# Patient Record
Sex: MALE | Race: WHITE | NOT HISPANIC OR LATINO
[De-identification: names, ages, dates, MRNs, and addresses within clinical notes are randomized per-mention and may not be internally consistent; named-entity substitution may affect disease eponyms.]

---

## 2022-03-26 ENCOUNTER — TRANSCRIPTION ENCOUNTER (OUTPATIENT)
Age: 27
End: 2022-03-26

## 2022-06-16 ENCOUNTER — APPOINTMENT (OUTPATIENT)
Dept: ORTHOPEDIC SURGERY | Facility: CLINIC | Age: 27
End: 2022-06-16
Payer: COMMERCIAL

## 2022-06-16 DIAGNOSIS — Z78.9 OTHER SPECIFIED HEALTH STATUS: ICD-10-CM

## 2022-06-16 DIAGNOSIS — S62.025K NONDISPLACED FRACTURE OF MIDDLE THIRD OF NAVICULAR [SCAPHOID] BONE OF LEFT WRIST, SUBSEQUENT ENCOUNTER FOR FRACTURE WITH NONUNION: ICD-10-CM

## 2022-06-16 PROBLEM — Z00.00 ENCOUNTER FOR PREVENTIVE HEALTH EXAMINATION: Status: ACTIVE | Noted: 2022-06-16

## 2022-06-16 PROCEDURE — L3908: CPT

## 2022-06-16 PROCEDURE — 73110 X-RAY EXAM OF WRIST: CPT | Mod: LT

## 2022-06-16 PROCEDURE — 99213 OFFICE O/P EST LOW 20 MIN: CPT

## 2022-06-16 NOTE — HISTORY OF PRESENT ILLNESS
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

## 2022-06-16 NOTE — IMAGING
[Left] : left wrist [de-identified] : Left wrist:\par TTP over scaphoid\par dorsal flexion to 10\par volar flexion to 40 with pain [FreeTextEntry8] : Scaphoid nonunion with broken screw

## 2022-06-20 ENCOUNTER — FORM ENCOUNTER (OUTPATIENT)
Age: 27
End: 2022-06-20

## 2022-06-21 ENCOUNTER — APPOINTMENT (OUTPATIENT)
Dept: MRI IMAGING | Facility: CLINIC | Age: 27
End: 2022-06-21
Payer: COMMERCIAL

## 2022-06-21 PROCEDURE — 73223 MRI JOINT UPR EXTR W/O&W/DYE: CPT | Mod: LT

## 2022-06-23 ENCOUNTER — APPOINTMENT (OUTPATIENT)
Dept: ORTHOPEDIC SURGERY | Facility: CLINIC | Age: 27
End: 2022-06-23

## 2022-07-06 ENCOUNTER — NON-APPOINTMENT (OUTPATIENT)
Age: 27
End: 2022-07-06

## 2023-08-22 ENCOUNTER — APPOINTMENT (OUTPATIENT)
Dept: SURGERY | Facility: CLINIC | Age: 28
End: 2023-08-22
Payer: COMMERCIAL

## 2023-08-22 VITALS
WEIGHT: 250 LBS | DIASTOLIC BLOOD PRESSURE: 88 MMHG | OXYGEN SATURATION: 97 % | BODY MASS INDEX: 29.52 KG/M2 | HEIGHT: 77 IN | HEART RATE: 101 BPM | TEMPERATURE: 97.3 F | SYSTOLIC BLOOD PRESSURE: 144 MMHG | RESPIRATION RATE: 18 BRPM

## 2023-08-22 DIAGNOSIS — Z84.0 FAMILY HISTORY OF DISEASES OF THE SKIN AND SUBCUTANEOUS TISSUE: ICD-10-CM

## 2023-08-22 DIAGNOSIS — Z22.321 CARRIER OR SUSPECTED CARRIER OF METHICILLIN SUSCEPTIBLE STAPHYLOCOCCUS AUREUS: ICD-10-CM

## 2023-08-22 DIAGNOSIS — Z78.9 OTHER SPECIFIED HEALTH STATUS: ICD-10-CM

## 2023-08-22 DIAGNOSIS — Z22.322 CARRIER OR SUSPECTED CARRIER OF METHICILLIN RESISTANT STAPHYLOCOCCUS AUREUS: ICD-10-CM

## 2023-08-22 DIAGNOSIS — K40.90 UNILATERAL INGUINAL HERNIA, W/OUT OBSTRUCTION OR GANGRENE, NOT SPECIFIED AS RECURRENT: ICD-10-CM

## 2023-08-22 PROCEDURE — 99203 OFFICE O/P NEW LOW 30 MIN: CPT

## 2023-08-24 ENCOUNTER — OUTPATIENT (OUTPATIENT)
Dept: OUTPATIENT SERVICES | Facility: HOSPITAL | Age: 28
LOS: 1 days | End: 2023-08-24
Payer: COMMERCIAL

## 2023-08-24 VITALS
TEMPERATURE: 98 F | DIASTOLIC BLOOD PRESSURE: 84 MMHG | HEIGHT: 77 IN | RESPIRATION RATE: 15 BRPM | HEART RATE: 71 BPM | WEIGHT: 244.05 LBS | OXYGEN SATURATION: 97 % | SYSTOLIC BLOOD PRESSURE: 130 MMHG

## 2023-08-24 DIAGNOSIS — Z98.890 OTHER SPECIFIED POSTPROCEDURAL STATES: Chronic | ICD-10-CM

## 2023-08-24 DIAGNOSIS — K40.30 UNILATERAL INGUINAL HERNIA, WITH OBSTRUCTION, WITHOUT GANGRENE, NOT SPECIFIED AS RECURRENT: ICD-10-CM

## 2023-08-24 DIAGNOSIS — K40.90 UNILATERAL INGUINAL HERNIA, WITHOUT OBSTRUCTION OR GANGRENE, NOT SPECIFIED AS RECURRENT: ICD-10-CM

## 2023-08-24 DIAGNOSIS — Z01.818 ENCOUNTER FOR OTHER PREPROCEDURAL EXAMINATION: ICD-10-CM

## 2023-08-24 LAB
ANION GAP SERPL CALC-SCNC: 14 MMOL/L — SIGNIFICANT CHANGE UP (ref 5–17)
BUN SERPL-MCNC: 16 MG/DL — SIGNIFICANT CHANGE UP (ref 7–23)
CALCIUM SERPL-MCNC: 9.9 MG/DL — SIGNIFICANT CHANGE UP (ref 8.4–10.5)
CHLORIDE SERPL-SCNC: 104 MMOL/L — SIGNIFICANT CHANGE UP (ref 96–108)
CO2 SERPL-SCNC: 24 MMOL/L — SIGNIFICANT CHANGE UP (ref 22–31)
CREAT SERPL-MCNC: 1.12 MG/DL — SIGNIFICANT CHANGE UP (ref 0.5–1.3)
EGFR: 94 ML/MIN/1.73M2 — SIGNIFICANT CHANGE UP
GLUCOSE SERPL-MCNC: 80 MG/DL — SIGNIFICANT CHANGE UP (ref 70–99)
HCT VFR BLD CALC: 48.4 % — SIGNIFICANT CHANGE UP (ref 39–50)
HGB BLD-MCNC: 16.5 G/DL — SIGNIFICANT CHANGE UP (ref 13–17)
MCHC RBC-ENTMCNC: 31.1 PG — SIGNIFICANT CHANGE UP (ref 27–34)
MCHC RBC-ENTMCNC: 34.1 GM/DL — SIGNIFICANT CHANGE UP (ref 32–36)
MCV RBC AUTO: 91.3 FL — SIGNIFICANT CHANGE UP (ref 80–100)
MRSA SPEC QL CULT: NOT DETECTED
NRBC # BLD: 0 /100 WBCS — SIGNIFICANT CHANGE UP (ref 0–0)
PLATELET # BLD AUTO: 277 K/UL — SIGNIFICANT CHANGE UP (ref 150–400)
POTASSIUM SERPL-MCNC: 3.9 MMOL/L — SIGNIFICANT CHANGE UP (ref 3.5–5.3)
POTASSIUM SERPL-SCNC: 3.9 MMOL/L — SIGNIFICANT CHANGE UP (ref 3.5–5.3)
RBC # BLD: 5.3 M/UL — SIGNIFICANT CHANGE UP (ref 4.2–5.8)
RBC # FLD: 11.8 % — SIGNIFICANT CHANGE UP (ref 10.3–14.5)
SODIUM SERPL-SCNC: 142 MMOL/L — SIGNIFICANT CHANGE UP (ref 135–145)
STAPH AUREUS (SA): NOT DETECTED
WBC # BLD: 6.23 K/UL — SIGNIFICANT CHANGE UP (ref 3.8–10.5)
WBC # FLD AUTO: 6.23 K/UL — SIGNIFICANT CHANGE UP (ref 3.8–10.5)

## 2023-08-24 PROCEDURE — 80048 BASIC METABOLIC PNL TOTAL CA: CPT

## 2023-08-24 PROCEDURE — G0463: CPT

## 2023-08-24 PROCEDURE — 85027 COMPLETE CBC AUTOMATED: CPT

## 2023-08-24 RX ORDER — SODIUM CHLORIDE 9 MG/ML
1000 INJECTION, SOLUTION INTRAVENOUS
Refills: 0 | Status: DISCONTINUED | OUTPATIENT
Start: 2023-08-30 | End: 2023-09-13

## 2023-08-24 RX ORDER — SODIUM CHLORIDE 9 MG/ML
3 INJECTION INTRAMUSCULAR; INTRAVENOUS; SUBCUTANEOUS EVERY 8 HOURS
Refills: 0 | Status: DISCONTINUED | OUTPATIENT
Start: 2023-08-30 | End: 2023-09-13

## 2023-08-24 NOTE — H&P PST ADULT - ASSESSMENT
Dental:  no loose teeth, no removable teeth    Dasi: Very active, plays basketball several times a week, goes to gym daily cardio/wts

## 2023-08-24 NOTE — H&P PST ADULT - PROBLEM SELECTOR PLAN 1
Scheduled for open left inguinal hernia repair on 8/30/2023  Labs pending.  MRSA done in surgeon's office, neg or MRSA/MSSA  Chlorhexidine to affected area preop

## 2023-08-24 NOTE — H&P PST ADULT - HISTORY OF PRESENT ILLNESS
Mr. Salinas is a 28 yo man with no significant medical history with a left inguinal hernia scheduled for left inguinal hernia repair on 8/30/2022.

## 2023-08-29 ENCOUNTER — TRANSCRIPTION ENCOUNTER (OUTPATIENT)
Age: 28
End: 2023-08-29

## 2023-08-30 ENCOUNTER — RESULT REVIEW (OUTPATIENT)
Age: 28
End: 2023-08-30

## 2023-08-30 ENCOUNTER — TRANSCRIPTION ENCOUNTER (OUTPATIENT)
Age: 28
End: 2023-08-30

## 2023-08-30 ENCOUNTER — OUTPATIENT (OUTPATIENT)
Dept: OUTPATIENT SERVICES | Facility: HOSPITAL | Age: 28
LOS: 1 days | End: 2023-08-30
Payer: COMMERCIAL

## 2023-08-30 ENCOUNTER — APPOINTMENT (OUTPATIENT)
Dept: SURGERY | Facility: HOSPITAL | Age: 28
End: 2023-08-30
Payer: COMMERCIAL

## 2023-08-30 VITALS
HEART RATE: 62 BPM | HEIGHT: 77 IN | SYSTOLIC BLOOD PRESSURE: 129 MMHG | OXYGEN SATURATION: 99 % | TEMPERATURE: 98 F | DIASTOLIC BLOOD PRESSURE: 83 MMHG | WEIGHT: 244.05 LBS | RESPIRATION RATE: 16 BRPM

## 2023-08-30 VITALS
HEART RATE: 73 BPM | DIASTOLIC BLOOD PRESSURE: 59 MMHG | SYSTOLIC BLOOD PRESSURE: 123 MMHG | TEMPERATURE: 97 F | OXYGEN SATURATION: 97 % | RESPIRATION RATE: 14 BRPM

## 2023-08-30 DIAGNOSIS — Z98.890 OTHER SPECIFIED POSTPROCEDURAL STATES: Chronic | ICD-10-CM

## 2023-08-30 DIAGNOSIS — K40.90 UNILATERAL INGUINAL HERNIA, WITHOUT OBSTRUCTION OR GANGRENE, NOT SPECIFIED AS RECURRENT: ICD-10-CM

## 2023-08-30 PROCEDURE — 49505 PRP I/HERN INIT REDUC >5 YR: CPT | Mod: RT

## 2023-08-30 PROCEDURE — 49505 PRP I/HERN INIT REDUC >5 YR: CPT | Mod: LT

## 2023-08-30 PROCEDURE — 88304 TISSUE EXAM BY PATHOLOGIST: CPT

## 2023-08-30 PROCEDURE — 88304 TISSUE EXAM BY PATHOLOGIST: CPT | Mod: 26

## 2023-08-30 PROCEDURE — C9399: CPT

## 2023-08-30 PROCEDURE — C1781: CPT

## 2023-08-30 PROCEDURE — C1889: CPT

## 2023-08-30 DEVICE — CLIP APPLIER COVIDIEN SURGICLIP III 9" SM: Type: IMPLANTABLE DEVICE | Site: LEFT | Status: FUNCTIONAL

## 2023-08-30 DEVICE — MESH HERNIA PARIETEX PROGRIP 14 X 9CM LEFT: Type: IMPLANTABLE DEVICE | Site: LEFT | Status: FUNCTIONAL

## 2023-08-30 RX ORDER — OXYCODONE HYDROCHLORIDE 5 MG/1
1 TABLET ORAL
Qty: 5 | Refills: 0
Start: 2023-08-30

## 2023-08-30 RX ORDER — FENTANYL CITRATE 50 UG/ML
25 INJECTION INTRAVENOUS
Refills: 0 | Status: DISCONTINUED | OUTPATIENT
Start: 2023-08-30 | End: 2023-08-30

## 2023-08-30 RX ORDER — ONDANSETRON 8 MG/1
4 TABLET, FILM COATED ORAL ONCE
Refills: 0 | Status: COMPLETED | OUTPATIENT
Start: 2023-08-30 | End: 2023-08-30

## 2023-08-30 RX ORDER — LIDOCAINE HCL 20 MG/ML
0.2 VIAL (ML) INJECTION ONCE
Refills: 0 | Status: COMPLETED | OUTPATIENT
Start: 2023-08-30 | End: 2023-08-30

## 2023-08-30 RX ORDER — CHLORHEXIDINE GLUCONATE 213 G/1000ML
1 SOLUTION TOPICAL ONCE
Refills: 0 | Status: COMPLETED | OUTPATIENT
Start: 2023-08-30 | End: 2023-08-30

## 2023-08-30 RX ORDER — CEFAZOLIN SODIUM 1 G
2000 VIAL (EA) INJECTION ONCE
Refills: 0 | Status: COMPLETED | OUTPATIENT
Start: 2023-08-30 | End: 2023-08-30

## 2023-08-30 RX ORDER — IBUPROFEN 200 MG
1 TABLET ORAL
Qty: 0 | Refills: 0 | DISCHARGE

## 2023-08-30 RX ORDER — ACETAMINOPHEN 500 MG
1 TABLET ORAL
Qty: 0 | Refills: 0 | DISCHARGE

## 2023-08-30 RX ADMIN — FENTANYL CITRATE 25 MICROGRAM(S): 50 INJECTION INTRAVENOUS at 09:05

## 2023-08-30 RX ADMIN — ONDANSETRON 4 MILLIGRAM(S): 8 TABLET, FILM COATED ORAL at 08:48

## 2023-08-30 RX ADMIN — SODIUM CHLORIDE 100 MILLILITER(S): 9 INJECTION, SOLUTION INTRAVENOUS at 06:13

## 2023-08-30 RX ADMIN — CHLORHEXIDINE GLUCONATE 1 APPLICATION(S): 213 SOLUTION TOPICAL at 06:13

## 2023-08-30 RX ADMIN — FENTANYL CITRATE 25 MICROGRAM(S): 50 INJECTION INTRAVENOUS at 09:20

## 2023-08-30 NOTE — ASU DISCHARGE PLAN (ADULT/PEDIATRIC) - DISCHARGE PLAN IS COMPLETE AND GIVEN TO PATIENT
Incoming call from patient who encountered injection failure with 2 Humira pens. See gonzalo.   : Yes

## 2023-08-30 NOTE — ASU DISCHARGE PLAN (ADULT/PEDIATRIC) - NS MD DC FALL RISK RISK
For information on Fall & Injury Prevention, visit: [email or web address] OR  [email or web address] OR  https://www.cdc.gov/steadi/patient.html

## 2023-08-30 NOTE — ASU DISCHARGE PLAN (ADULT/PEDIATRIC) - ASU DC SPECIAL INSTRUCTIONSFT
You may shower over the steri-strips starting in the AM.  You may eat a regular diet.  You may not drive a car until you no longer need the prescribed pain medications.  You should not lift more the 30 pounds for at least 30 days.  You may return to work when you are comfortable.  If you do not have a bowel movement in the next day or so, take prunes, milk of magnesia or a fleets enema.  The operative area will feel hard and this is normal.  There may be some black and blue in the operative area and at the top of the scrotum this also is normal.  Please call the office ([phone]) and make an appointment in 7-10 days for post-op exam.

## 2023-08-30 NOTE — PHYSICAL EXAM
[Normal Breath Sounds] : Normal breath sounds [Normal Heart Sounds] : normal heart sounds [No Rash or Lesion] : No rash or lesion [Alert] : alert [Oriented to Person] : oriented to person [Oriented to Place] : oriented to place [Calm] : calm [de-identified] : WNL [de-identified] : WNL -cranial nerves II through XII intact [de-identified] : JAYNEL [de-identified] : Examination of the left groin reveals a small hernia at the internal ring.  The left cord and testicle are normal. [de-identified] : WNL ROM [de-identified] : WNL

## 2023-08-30 NOTE — ASU DISCHARGE PLAN (ADULT/PEDIATRIC) - CARE PROVIDER_API CALL
Avery Driscoll  Surgery  [address] [place] [address], [facility] [address]  [place] [address]  Phone: [phone]  Fax: [phone]  Follow Up Time: 1 week

## 2023-08-30 NOTE — ASU DISCHARGE PLAN (ADULT/PEDIATRIC) - BATHING
No shower until 08/31 AM. Ok to shower, do not scrub on steri-strips over incision, just let water wash over it. Do not remove steristrips, they will fall off on their own or we will remove in office.

## 2023-08-30 NOTE — BRIEF OPERATIVE NOTE - NSICDXBRIEFPROCEDURE_GEN_ALL_CORE_FT
PROCEDURES:  Repair, hernia, inguinal, open, using mesh, adult 30-Aug-2023 08:46:02  Dewey Willett

## 2023-08-30 NOTE — HISTORY OF PRESENT ILLNESS
[de-identified] : Donny is a 28 y/o male here for consultation visit for possible LIH.  The patient had a ultrasound of the groin with Valsalva which highly suggestive of a fat-containing inguinal   hernia.  Patient was then sent for a CT of the abdomen and pelvis which was done without Valsalva which showed no hernia.  The patient complains of left groin pain radiating into the testicle.

## 2023-08-30 NOTE — ASU DISCHARGE PLAN (ADULT/PEDIATRIC) - NURSING INSTRUCTIONS
You received a dose of Tylenol today at 7:15 AM this morning. If needed, next dose time is 1:15 PM this afternoon. Do not exceed 4,000 mg of Tylenol in a 24 hour period.

## 2023-08-30 NOTE — CONSULT LETTER
[Dear  ___] : Dear  [unfilled], [Consult Letter:] : I had the pleasure of evaluating your patient, [unfilled]. [Please see my note below.] : Please see my note below. [Consult Closing:] : Thank you very much for allowing me to participate in the care of this patient.  If you have any questions, please do not hesitate to contact me. [Sincerely,] : Sincerely, [FreeTextEntry3] : I have reviewed all the documentation for this encounter with the patient and have edited where appropriate  Dr. Avery Driscoll

## 2023-08-31 PROBLEM — Z78.9 OTHER SPECIFIED HEALTH STATUS: Chronic | Status: ACTIVE | Noted: 2023-08-24

## 2023-09-02 ENCOUNTER — EMERGENCY (EMERGENCY)
Facility: HOSPITAL | Age: 28
LOS: 1 days | Discharge: HOME | End: 2023-09-02
Attending: EMERGENCY MEDICINE
Payer: COMMERCIAL

## 2023-09-02 ENCOUNTER — TRANSCRIPTION ENCOUNTER (OUTPATIENT)
Age: 28
End: 2023-09-02

## 2023-09-02 VITALS
HEIGHT: 77 IN | OXYGEN SATURATION: 99 % | SYSTOLIC BLOOD PRESSURE: 151 MMHG | TEMPERATURE: 99 F | DIASTOLIC BLOOD PRESSURE: 78 MMHG | HEART RATE: 86 BPM | WEIGHT: 250 LBS | RESPIRATION RATE: 18 BRPM

## 2023-09-02 VITALS
OXYGEN SATURATION: 100 % | SYSTOLIC BLOOD PRESSURE: 140 MMHG | TEMPERATURE: 98 F | RESPIRATION RATE: 18 BRPM | DIASTOLIC BLOOD PRESSURE: 71 MMHG | HEART RATE: 65 BPM

## 2023-09-02 DIAGNOSIS — Z98.890 OTHER SPECIFIED POSTPROCEDURAL STATES: Chronic | ICD-10-CM

## 2023-09-02 LAB
ALBUMIN SERPL ELPH-MCNC: 4.2 G/DL — SIGNIFICANT CHANGE UP (ref 3.3–5)
ALP SERPL-CCNC: 53 U/L — SIGNIFICANT CHANGE UP (ref 40–120)
ALT FLD-CCNC: 17 U/L — SIGNIFICANT CHANGE UP (ref 10–45)
ANION GAP SERPL CALC-SCNC: 14 MMOL/L — SIGNIFICANT CHANGE UP (ref 5–17)
AST SERPL-CCNC: 14 U/L — SIGNIFICANT CHANGE UP (ref 10–40)
BASOPHILS # BLD AUTO: 0.03 K/UL — SIGNIFICANT CHANGE UP (ref 0–0.2)
BASOPHILS NFR BLD AUTO: 0.5 % — SIGNIFICANT CHANGE UP (ref 0–2)
BILIRUB SERPL-MCNC: 0.4 MG/DL — SIGNIFICANT CHANGE UP (ref 0.2–1.2)
BUN SERPL-MCNC: 21 MG/DL — SIGNIFICANT CHANGE UP (ref 7–23)
CALCIUM SERPL-MCNC: 9.9 MG/DL — SIGNIFICANT CHANGE UP (ref 8.4–10.5)
CHLORIDE SERPL-SCNC: 107 MMOL/L — SIGNIFICANT CHANGE UP (ref 96–108)
CO2 SERPL-SCNC: 21 MMOL/L — LOW (ref 22–31)
CREAT SERPL-MCNC: 1.06 MG/DL — SIGNIFICANT CHANGE UP (ref 0.5–1.3)
EGFR: 100 ML/MIN/1.73M2 — SIGNIFICANT CHANGE UP
EOSINOPHIL # BLD AUTO: 0.12 K/UL — SIGNIFICANT CHANGE UP (ref 0–0.5)
EOSINOPHIL NFR BLD AUTO: 2.1 % — SIGNIFICANT CHANGE UP (ref 0–6)
GLUCOSE SERPL-MCNC: 92 MG/DL — SIGNIFICANT CHANGE UP (ref 70–99)
HCT VFR BLD CALC: 44.6 % — SIGNIFICANT CHANGE UP (ref 39–50)
HGB BLD-MCNC: 15 G/DL — SIGNIFICANT CHANGE UP (ref 13–17)
IMM GRANULOCYTES NFR BLD AUTO: 0.3 % — SIGNIFICANT CHANGE UP (ref 0–0.9)
LYMPHOCYTES # BLD AUTO: 1.43 K/UL — SIGNIFICANT CHANGE UP (ref 1–3.3)
LYMPHOCYTES # BLD AUTO: 25 % — SIGNIFICANT CHANGE UP (ref 13–44)
MAGNESIUM SERPL-MCNC: 2 MG/DL — SIGNIFICANT CHANGE UP (ref 1.6–2.6)
MCHC RBC-ENTMCNC: 30.7 PG — SIGNIFICANT CHANGE UP (ref 27–34)
MCHC RBC-ENTMCNC: 33.6 GM/DL — SIGNIFICANT CHANGE UP (ref 32–36)
MCV RBC AUTO: 91.2 FL — SIGNIFICANT CHANGE UP (ref 80–100)
MONOCYTES # BLD AUTO: 0.64 K/UL — SIGNIFICANT CHANGE UP (ref 0–0.9)
MONOCYTES NFR BLD AUTO: 11.2 % — SIGNIFICANT CHANGE UP (ref 2–14)
NEUTROPHILS # BLD AUTO: 3.49 K/UL — SIGNIFICANT CHANGE UP (ref 1.8–7.4)
NEUTROPHILS NFR BLD AUTO: 60.9 % — SIGNIFICANT CHANGE UP (ref 43–77)
NRBC # BLD: 0 /100 WBCS — SIGNIFICANT CHANGE UP (ref 0–0)
PLATELET # BLD AUTO: 232 K/UL — SIGNIFICANT CHANGE UP (ref 150–400)
POTASSIUM SERPL-MCNC: 3.7 MMOL/L — SIGNIFICANT CHANGE UP (ref 3.5–5.3)
POTASSIUM SERPL-SCNC: 3.7 MMOL/L — SIGNIFICANT CHANGE UP (ref 3.5–5.3)
PROCALCITONIN SERPL-MCNC: 0.03 NG/ML — SIGNIFICANT CHANGE UP (ref 0.02–0.1)
PROT SERPL-MCNC: 6.8 G/DL — SIGNIFICANT CHANGE UP (ref 6–8.3)
RBC # BLD: 4.89 M/UL — SIGNIFICANT CHANGE UP (ref 4.2–5.8)
RBC # FLD: 11.9 % — SIGNIFICANT CHANGE UP (ref 10.3–14.5)
SODIUM SERPL-SCNC: 142 MMOL/L — SIGNIFICANT CHANGE UP (ref 135–145)
WBC # BLD: 5.73 K/UL — SIGNIFICANT CHANGE UP (ref 3.8–10.5)
WBC # FLD AUTO: 5.73 K/UL — SIGNIFICANT CHANGE UP (ref 3.8–10.5)

## 2023-09-02 PROCEDURE — 80053 COMPREHEN METABOLIC PANEL: CPT

## 2023-09-02 PROCEDURE — 96374 THER/PROPH/DIAG INJ IV PUSH: CPT | Mod: XU

## 2023-09-02 PROCEDURE — 83735 ASSAY OF MAGNESIUM: CPT

## 2023-09-02 PROCEDURE — 99285 EMERGENCY DEPT VISIT HI MDM: CPT

## 2023-09-02 PROCEDURE — 84145 PROCALCITONIN (PCT): CPT

## 2023-09-02 PROCEDURE — 85025 COMPLETE CBC W/AUTO DIFF WBC: CPT

## 2023-09-02 PROCEDURE — 99284 EMERGENCY DEPT VISIT MOD MDM: CPT | Mod: 25

## 2023-09-02 PROCEDURE — 71045 X-RAY EXAM CHEST 1 VIEW: CPT

## 2023-09-02 PROCEDURE — 71045 X-RAY EXAM CHEST 1 VIEW: CPT | Mod: 26

## 2023-09-02 PROCEDURE — 36415 COLL VENOUS BLD VENIPUNCTURE: CPT

## 2023-09-02 RX ORDER — SODIUM CHLORIDE 9 MG/ML
1000 INJECTION, SOLUTION INTRAVENOUS ONCE
Refills: 0 | Status: COMPLETED | OUTPATIENT
Start: 2023-09-02 | End: 2023-09-02

## 2023-09-02 RX ORDER — ACETAMINOPHEN 500 MG
1000 TABLET ORAL ONCE
Refills: 0 | Status: COMPLETED | OUTPATIENT
Start: 2023-09-02 | End: 2023-09-02

## 2023-09-02 RX ADMIN — SODIUM CHLORIDE 2000 MILLILITER(S): 9 INJECTION, SOLUTION INTRAVENOUS at 14:23

## 2023-09-02 RX ADMIN — Medication 1 ENEMA: at 14:51

## 2023-09-02 RX ADMIN — Medication 400 MILLIGRAM(S): at 13:56

## 2023-09-02 NOTE — ED PROVIDER NOTE - CLINICAL SUMMARY MEDICAL DECISION MAKING FREE TEXT BOX
Henry Flanagan, DO PGY-3: 27-year-old male no past medical history presents ED complaining of left groin pain status post left inguinal hernia repair on 8/30 with Dr. Driscoll.  Patient states since surgery the pain has been gradually increasing with worsening constipation.  Patient is passing flatus.  Patient states his dog jumped on him which made the pain even worse prompting the ED visit today.  Denies fever, cough, shortness of breath, chest pain, abdominal pain, dysuria, diarrhea. Surgical site well-appearing with tenderness over the area, hemodynamically stable and afebrile.  Differential includes not limited to postop pain, low suspicion for abscess, SBO.  Plan for labs, CT.  Reassess

## 2023-09-02 NOTE — ED PROVIDER NOTE - NSFOLLOWUPINSTRUCTIONS_ED_ALL_ED_FT
See your Primary Doctor / Surgeons next week for follow up -- call to discuss.    Stay well hydrated, eat regular healthy meals, increase activity as tolerated.    Use Miralax daily as directed for constipation -- see medication warnings.    See CONSTIPATION information and return instructions given to you.    Seek immediate medical care for new/worsening symptoms/concerns.

## 2023-09-02 NOTE — ED ADULT NURSE NOTE - OBJECTIVE STATEMENT
Pt is a 27y M, AxO3, no signif PMH, presents to ED for post surgical pain. Pt had a L inguinal hernia Sx 4 days ago on Wednesday. Pt reporting increased pain since the procedure. On Wednesday evening pt dog jumped onto him, furthering the pain. On assessment, incision site is clean and dry, abd is soft, tender to LLQ, mild numbness noted below incision site. Endorsing constipation- last BM Tuesday before Sx, inability to ambulate or sit up due to pain. Has been passing flatus.  Denies fevers, chills, N/V, dizziness, loss of appetite. Pt is resting in bed, speaking full sentences, VSS, no acute distress at this time. Pt is a 27y M, AxO3, no signif PMH, presents to ED for post surgical pain. Pt had a L inguinal hernia Sx 4 days ago on Wednesday. Pt reporting increased pain localized to groin since the procedure. On Wednesday evening pt dog jumped onto him, furthering the pain. On assessment, incision site is clean and dry, abd is soft, tender to LLQ, mild numbness noted below incision site. Endorsing constipation- last BM Tuesday before Sx, inability to ambulate or sit up due to pain. Has been passing flatus.  Denies fevers, chills, N/V, dizziness, loss of appetite. Pt is resting in bed, speaking full sentences, VSS, no acute distress at this time.

## 2023-09-02 NOTE — ED PROVIDER NOTE - PHYSICAL EXAMINATION
GEN: Patient awake alert NAD.   HEENT: normocephalic, atraumatic, moist MM  CARDIAC: RRR, S1, S2, no murmur.   PULM: CTA B/L no wheeze, rhonchi, rales.   ABD: soft NT, ND, no rebound no guarding.  Left inguinal surgical scar well-appearing with tenderness and Steri-Strips in place, without purulent drainage or surrounding erythema  MSK: Moving all extremities, no edema.  NEURO: A&Ox3, no focal neurological deficits  SKIN: warm, dry, no rash.

## 2023-09-02 NOTE — ED ADULT NURSE NOTE - NSFALLRISKINTERV_ED_ALL_ED

## 2023-09-02 NOTE — CONSULT NOTE ADULT - SUBJECTIVE AND OBJECTIVE BOX
SURGERY CONSULT NOTE    Consulting Team:     Patient: LIGIA ORANTES , 27y ([birth year])Male   MRN: [id number]  Location: [facility] ED  Visit: 09-02-23 Emergency  Date: 09-02-23 @ 14:03      HPI:  Patient is a 26 yo M w/ no significant past medical history presents to the ED with L inguinal pain after elective L inguinal hernia repair w/ mesh on 8/30 with Dr. Driscoll. Patient states after returning home from surgery, dog stepped on surgical site with small site oozing and increased pain. Since then, patient states minimal pain relief at surgical site on oxy for 1.5 days and Tylenol/Motrin around the clock. Patient states unable to ambulate 2/2 pain.     Patient otherwise denies fever, chills. Tolerate diet without nausea/vomiting. Denies dysuria. Last BM 4 days ago, however passing gas daily.     In the ED, patient is hemodynamically stable.       PAST MEDICAL HISTORY:  No pertinent past medical history        PAST SURGICAL HISTORY:  S/P wrist surgery    H/O elbow surgery        MEDICATIONS:  lactated ringers Bolus 1000 milliLiter(s) IV Bolus once      ALLERGIES:  No Known Allergies      VITALS & I/Os:  Vital Signs Last 24 Hrs  T(C): 37.2 (02 Sep 2023 12:41), Max: 37.2 (02 Sep 2023 12:41)  T(F): 99 (02 Sep 2023 12:41), Max: 99 (02 Sep 2023 12:41)  HR: 86 (02 Sep 2023 12:41) (86 - 86)  BP: 151/78 (02 Sep 2023 12:41) (151/78 - 151/78)  RR: 18 (02 Sep 2023 12:41) (18 - 18)  SpO2: 99% (02 Sep 2023 12:41) (99% - 99%)    Parameters below as of 02 Sep 2023 12:41  Patient On (Oxygen Delivery Method): room air        I&O's Summary      PHYSICAL EXAM:  General: No acute distress  Respiratory: Nonlabored  Cardiovascular: RRR  Abdominal: Tender to palpation in L inguinal per-incisional region with khloe-incisional numbness. Incision c/d/i without redness, induration. No evidence of hematoma or fluid collection. No palpable recurrent hernia. Soft, nondistended. No rebound or guarding. No organomegaly, no palpable mass.  Extremities: Warm    LABS:          Lactate:                    IMAGING:       SURGERY CONSULT NOTE    Consulting Team:     Patient: LIGIA ORANTES , 27y ([birth year])Male   MRN: [id number]  Location: [facility] ED  Visit: 09-02-23 Emergency  Date: 09-02-23 @ 14:03      HPI:  Patient is a 26 yo M w/ no significant past medical history presents to the ED with L inguinal pain after elective L inguinal hernia repair w/ mesh on 8/30 with Dr. Driscoll. Patient states after returning home from surgery, dog stepped on surgical site with small site oozing and increased pain. Since then, patient states minimal pain relief at surgical site on oxy for 1.5 days and Tylenol/Motrin around the clock. Patient states unable to ambulate 2/2 pain.     Patient otherwise denies fever, chills. Tolerate diet without nausea/vomiting. Denies dysuria. Last BM 4 days ago, however passing gas daily.     In the ED, patient is hemodynamically stable.       PAST MEDICAL HISTORY:  No pertinent past medical history        PAST SURGICAL HISTORY:  S/P wrist surgery    H/O elbow surgery        MEDICATIONS:  lactated ringers Bolus 1000 milliLiter(s) IV Bolus once      ALLERGIES:  No Known Allergies      VITALS & I/Os:  Vital Signs Last 24 Hrs  T(C): 37.2 (02 Sep 2023 12:41), Max: 37.2 (02 Sep 2023 12:41)  T(F): 99 (02 Sep 2023 12:41), Max: 99 (02 Sep 2023 12:41)  HR: 86 (02 Sep 2023 12:41) (86 - 86)  BP: 151/78 (02 Sep 2023 12:41) (151/78 - 151/78)  RR: 18 (02 Sep 2023 12:41) (18 - 18)  SpO2: 99% (02 Sep 2023 12:41) (99% - 99%)    Parameters below as of 02 Sep 2023 12:41  Patient On (Oxygen Delivery Method): room air        I&O's Summary      PHYSICAL EXAM:  General: No acute distress  Respiratory: Nonlabored  Cardiovascular: RRR  Abdominal: Tender to palpation in L inguinal per-incisional region with khloe-incisional numbness. Incision c/d/i without redness, induration. No evidence of hematoma or fluid collection. No palpable recurrent hernia. Soft, nondistended. No rebound or guarding. No organomegaly, no palpable mass.  Extremities: Warm   SURGERY CONSULT NOTE    Consulting Team:     Patient: LIGIA ORANTES , 27y ([birth year])Male   MRN: [id number]  Location: [facility] ED  Visit: 09-02-23 Emergency  Date: 09-02-23 @ 14:03      HPI:  Patient is a 26 yo M w/ no significant past medical history presents to the ED with L inguinal pain after elective L inguinal hernia repair w/ mesh on 8/30 with Dr. Driscoll. Patient states after returning home from surgery, dog stepped on surgical site with small site oozing and increased pain. Since then, patient states minimal pain relief at surgical site on oxy for 1.5 days and Tylenol/Motrin around the clock. Patient states unable to ambulate 2/2 pain.     Patient otherwise denies fever, chills. Tolerate diet without nausea/vomiting. Denies dysuria. Last BM 4 days ago, however passing gas daily.     In the ED, patient is hemodynamically stable.       PAST MEDICAL HISTORY:  No pertinent past medical history        PAST SURGICAL HISTORY:  S/P wrist surgery    H/O elbow surgery        MEDICATIONS:  lactated ringers Bolus 1000 milliLiter(s) IV Bolus once      ALLERGIES:  No Known Allergies      VITALS & I/Os:  Vital Signs Last 24 Hrs  T(C): 37.2 (02 Sep 2023 12:41), Max: 37.2 (02 Sep 2023 12:41)  T(F): 99 (02 Sep 2023 12:41), Max: 99 (02 Sep 2023 12:41)  HR: 86 (02 Sep 2023 12:41) (86 - 86)  BP: 151/78 (02 Sep 2023 12:41) (151/78 - 151/78)  RR: 18 (02 Sep 2023 12:41) (18 - 18)  SpO2: 99% (02 Sep 2023 12:41) (99% - 99%)    Parameters below as of 02 Sep 2023 12:41  Patient On (Oxygen Delivery Method): room air        I&O's Summary      PHYSICAL EXAM:  General: No acute distress  Respiratory: Nonlabored  Cardiovascular: RRR  Abdominal: Tender to palpation in L inguinal per-incisional region with khloe-incisional numbness. Incision c/d/i without redness, induration. No evidence of hematoma or fluid collection. No palpable recurrent hernia. Soft, nondistended. No rebound or guarding. No organomegaly, no palpable mass.  : No scrotal swelling or discoloration. Testes descended bilaterally, no palpable masses, nontender. No inguinal hernia   Extremities: Warm

## 2023-09-02 NOTE — CONSULT NOTE ADULT - ASSESSMENT
- INCOMPLETE -     [facility]  [id number] Patient is a 26 yo M w/ no significant past medical history presents to the ED with L inguinal pain after elective L inguinal hernia repair w/ mesh on 8/30 with Dr. Driscoll. Patient states after returning home from surgery, dog stepped on surgical site with small site oozing and increased pain. Since then, patient states minimal pain relief at surgical site on oxy for 1.5 days and Tylenol/Motrin around the clock. Patient states unable to ambulate 2/2 pain. Patient otherwise denies fever, chills. Tolerate diet without nausea/vomiting. Denies dysuria. Last BM 4 days ago, however passing gas daily. In the ED, patient is hemodynamically stable.     Recommendations:   - No acute surgical intervention   - Patient presenting with likely uncontrolled post-op pain      - No clinical signs of surgical site infection. Continues to pass flatus daily, patient is not clinically obstructed. No palpable bulge on exam to suggest recurrence   - Recommend multimodal pain control: Continue Tylenol/Motrin around the clock. Oxy 5mg [id number] x2 days   - Recommend bowel regimen for post-op constipation and inguinal pain w/ straining: miralax daily   - Please have patient call the office to schedule outpatient f/u visit with Dr. Driscoll next week   - Dispo per ED     Plan discussed with Dr. Singh        [facility]  [id number]

## 2023-09-02 NOTE — ED ADULT TRIAGE NOTE - CHIEF COMPLAINT QUOTE
L. inguinal hernia Sx on Wednesday, dog stepped on surgical site on Wednesday, slight bleeding since Sx on Thursday, acute pain to surgical site, no relief from pain meds at home, surgical site absent of s/s of infection at this time, denies fevers/chills. Constipated, last BM Wednesday prior to Sx, passing gas.

## 2023-09-02 NOTE — ED PROVIDER NOTE - ATTENDING CONTRIBUTION TO CARE
------------ATTENDING NOTE------------    pt w/ partner c/o constipation as straining w/ BMs and hard nb stool since hernia repair 4 days ago, surgical site healing well, overall soft benign abd, evaluated by Surgery who agreed, no fevers/infectious symptoms, nop nausea/vomiting, tolerating PO, improved w/ BM after enema, nml VS at PR, in depth dw all about ddx, tx, trejo, continued close outpt fu.  - Armond Farmer MD   ---------------------------------------------

## 2023-09-02 NOTE — ED PROVIDER NOTE - PATIENT PORTAL LINK FT
You can access the FollowMyHealth Patient Portal offered by [facility] by registering at the following website: [email or web address]. By joining [employer]’s FollowMyHealth portal, you will also be able to view your health information using other applications (apps) compatible with our system.

## 2023-09-02 NOTE — ED PROVIDER NOTE - OBJECTIVE STATEMENT
27-year-old male no past medical history presents ED complaining of left groin pain status post left inguinal hernia repair on 8/30 with Dr. Driscoll.  Patient states since surgery the pain has been gradually increasing with worsening constipation.  Patient is passing flatus.  Patient states his dog jumped on him which made the pain even worse prompting the ED visit today.  Denies fever, cough, shortness of breath, chest pain, abdominal pain, dysuria, diarrhea.

## 2023-09-02 NOTE — ED PROVIDER NOTE - PROGRESS NOTE DETAILS
Henry Flanagan, DO PGY-3: Spoke with surgery who is low suspicion for abscess or internal bleeding and thinks patient's symptoms are likely secondary to postop pain.  They would like to wait on CT imaging pending lab results.  We had shared decision-making with the patient who agrees to wait on CT. Henry Flanagan, DO PGY-3: Patient's labs nonactionable, patient able to have a small bowel movement.  Patient well-appearing but having persistent pain likely secondary to postoperative period.  Will discharge patient to follow-up with Dr. Driscoll his surgeon.

## 2023-09-02 NOTE — ED ADULT NURSE REASSESSMENT NOTE - NS ED NURSE REASSESS COMMENT FT1
Pt reporting normal BM, endorsing sharp shooting rectal pains while defecating, denies any relief post BM. ED MD team aware.

## 2023-09-12 ENCOUNTER — NON-APPOINTMENT (OUTPATIENT)
Age: 28
End: 2023-09-12

## 2023-09-12 LAB — SURGICAL PATHOLOGY STUDY: SIGNIFICANT CHANGE UP

## 2023-09-13 ENCOUNTER — EMERGENCY (EMERGENCY)
Facility: HOSPITAL | Age: 28
LOS: 1 days | Discharge: HOME | End: 2023-09-13
Payer: COMMERCIAL

## 2023-09-13 VITALS
OXYGEN SATURATION: 98 % | HEART RATE: 104 BPM | SYSTOLIC BLOOD PRESSURE: 141 MMHG | WEIGHT: 250 LBS | TEMPERATURE: 98 F | RESPIRATION RATE: 20 BRPM | DIASTOLIC BLOOD PRESSURE: 90 MMHG | HEIGHT: 77 IN

## 2023-09-13 VITALS
OXYGEN SATURATION: 98 % | RESPIRATION RATE: 17 BRPM | SYSTOLIC BLOOD PRESSURE: 124 MMHG | TEMPERATURE: 98 F | HEART RATE: 89 BPM | DIASTOLIC BLOOD PRESSURE: 86 MMHG

## 2023-09-13 DIAGNOSIS — Z98.890 OTHER SPECIFIED POSTPROCEDURAL STATES: Chronic | ICD-10-CM

## 2023-09-13 LAB
ALBUMIN SERPL ELPH-MCNC: 4.7 G/DL — SIGNIFICANT CHANGE UP (ref 3.3–5)
ALP SERPL-CCNC: 70 U/L — SIGNIFICANT CHANGE UP (ref 40–120)
ALT FLD-CCNC: 23 U/L — SIGNIFICANT CHANGE UP (ref 10–45)
ANION GAP SERPL CALC-SCNC: 13 MMOL/L — SIGNIFICANT CHANGE UP (ref 5–17)
APPEARANCE UR: CLEAR — SIGNIFICANT CHANGE UP
AST SERPL-CCNC: 14 U/L — SIGNIFICANT CHANGE UP (ref 10–40)
BACTERIA # UR AUTO: NEGATIVE — SIGNIFICANT CHANGE UP
BASE EXCESS BLDV CALC-SCNC: 3.2 MMOL/L — HIGH (ref -2–3)
BASOPHILS # BLD AUTO: 0.05 K/UL — SIGNIFICANT CHANGE UP (ref 0–0.2)
BASOPHILS NFR BLD AUTO: 0.4 % — SIGNIFICANT CHANGE UP (ref 0–2)
BILIRUB SERPL-MCNC: 0.6 MG/DL — SIGNIFICANT CHANGE UP (ref 0.2–1.2)
BILIRUB UR-MCNC: NEGATIVE — SIGNIFICANT CHANGE UP
BUN SERPL-MCNC: 13 MG/DL — SIGNIFICANT CHANGE UP (ref 7–23)
CA-I SERPL-SCNC: 1.24 MMOL/L — SIGNIFICANT CHANGE UP (ref 1.15–1.33)
CALCIUM SERPL-MCNC: 10 MG/DL — SIGNIFICANT CHANGE UP (ref 8.4–10.5)
CHLORIDE BLDV-SCNC: 101 MMOL/L — SIGNIFICANT CHANGE UP (ref 96–108)
CHLORIDE SERPL-SCNC: 103 MMOL/L — SIGNIFICANT CHANGE UP (ref 96–108)
CO2 BLDV-SCNC: 33 MMOL/L — HIGH (ref 22–26)
CO2 SERPL-SCNC: 24 MMOL/L — SIGNIFICANT CHANGE UP (ref 22–31)
COLOR SPEC: YELLOW — SIGNIFICANT CHANGE UP
CREAT SERPL-MCNC: 1.2 MG/DL — SIGNIFICANT CHANGE UP (ref 0.5–1.3)
DIFF PNL FLD: NEGATIVE — SIGNIFICANT CHANGE UP
EGFR: 86 ML/MIN/1.73M2 — SIGNIFICANT CHANGE UP
EOSINOPHIL # BLD AUTO: 0.11 K/UL — SIGNIFICANT CHANGE UP (ref 0–0.5)
EOSINOPHIL NFR BLD AUTO: 0.9 % — SIGNIFICANT CHANGE UP (ref 0–6)
EPI CELLS # UR: 1 /HPF — SIGNIFICANT CHANGE UP
FLUAV AG NPH QL: SIGNIFICANT CHANGE UP
FLUBV AG NPH QL: SIGNIFICANT CHANGE UP
GAS PNL BLDV: 136 MMOL/L — SIGNIFICANT CHANGE UP (ref 136–145)
GAS PNL BLDV: SIGNIFICANT CHANGE UP
GAS PNL BLDV: SIGNIFICANT CHANGE UP
GLUCOSE BLDV-MCNC: 92 MG/DL — SIGNIFICANT CHANGE UP (ref 70–99)
GLUCOSE SERPL-MCNC: 93 MG/DL — SIGNIFICANT CHANGE UP (ref 70–99)
GLUCOSE UR QL: NEGATIVE — SIGNIFICANT CHANGE UP
HCO3 BLDV-SCNC: 31 MMOL/L — HIGH (ref 22–29)
HCT VFR BLD CALC: 47 % — SIGNIFICANT CHANGE UP (ref 39–50)
HCT VFR BLDA CALC: 49 % — SIGNIFICANT CHANGE UP (ref 39–51)
HGB BLD CALC-MCNC: 16.4 G/DL — SIGNIFICANT CHANGE UP (ref 12.6–17.4)
HGB BLD-MCNC: 16.2 G/DL — SIGNIFICANT CHANGE UP (ref 13–17)
HYALINE CASTS # UR AUTO: 11 /LPF — HIGH (ref 0–2)
IMM GRANULOCYTES NFR BLD AUTO: 0.2 % — SIGNIFICANT CHANGE UP (ref 0–0.9)
KETONES UR-MCNC: NEGATIVE — SIGNIFICANT CHANGE UP
LACTATE BLDV-MCNC: 1.8 MMOL/L — SIGNIFICANT CHANGE UP (ref 0.5–2)
LEUKOCYTE ESTERASE UR-ACNC: NEGATIVE — SIGNIFICANT CHANGE UP
LYMPHOCYTES # BLD AUTO: 1.86 K/UL — SIGNIFICANT CHANGE UP (ref 1–3.3)
LYMPHOCYTES # BLD AUTO: 15 % — SIGNIFICANT CHANGE UP (ref 13–44)
MCHC RBC-ENTMCNC: 30.7 PG — SIGNIFICANT CHANGE UP (ref 27–34)
MCHC RBC-ENTMCNC: 34.5 GM/DL — SIGNIFICANT CHANGE UP (ref 32–36)
MCV RBC AUTO: 89.2 FL — SIGNIFICANT CHANGE UP (ref 80–100)
MONOCYTES # BLD AUTO: 1.37 K/UL — HIGH (ref 0–0.9)
MONOCYTES NFR BLD AUTO: 11.1 % — SIGNIFICANT CHANGE UP (ref 2–14)
NEUTROPHILS # BLD AUTO: 8.97 K/UL — HIGH (ref 1.8–7.4)
NEUTROPHILS NFR BLD AUTO: 72.4 % — SIGNIFICANT CHANGE UP (ref 43–77)
NITRITE UR-MCNC: NEGATIVE — SIGNIFICANT CHANGE UP
NRBC # BLD: 0 /100 WBCS — SIGNIFICANT CHANGE UP (ref 0–0)
PCO2 BLDV: 59 MMHG — HIGH (ref 42–55)
PH BLDV: 7.33 — SIGNIFICANT CHANGE UP (ref 7.32–7.43)
PH UR: 6.5 — SIGNIFICANT CHANGE UP (ref 5–8)
PLATELET # BLD AUTO: 262 K/UL — SIGNIFICANT CHANGE UP (ref 150–400)
PO2 BLDV: 19 MMHG — LOW (ref 25–45)
POTASSIUM BLDV-SCNC: 4.1 MMOL/L — SIGNIFICANT CHANGE UP (ref 3.5–5.1)
POTASSIUM SERPL-MCNC: 4.2 MMOL/L — SIGNIFICANT CHANGE UP (ref 3.5–5.3)
POTASSIUM SERPL-SCNC: 4.2 MMOL/L — SIGNIFICANT CHANGE UP (ref 3.5–5.3)
PROT SERPL-MCNC: 7.9 G/DL — SIGNIFICANT CHANGE UP (ref 6–8.3)
PROT UR-MCNC: ABNORMAL
RBC # BLD: 5.27 M/UL — SIGNIFICANT CHANGE UP (ref 4.2–5.8)
RBC # FLD: 11.7 % — SIGNIFICANT CHANGE UP (ref 10.3–14.5)
RBC CASTS # UR COMP ASSIST: 1 /HPF — SIGNIFICANT CHANGE UP (ref 0–4)
RSV RNA NPH QL NAA+NON-PROBE: SIGNIFICANT CHANGE UP
SAO2 % BLDV: 23.4 % — LOW (ref 67–88)
SARS-COV-2 RNA SPEC QL NAA+PROBE: SIGNIFICANT CHANGE UP
SODIUM SERPL-SCNC: 140 MMOL/L — SIGNIFICANT CHANGE UP (ref 135–145)
SP GR SPEC: >1.05 (ref 1.01–1.02)
UROBILINOGEN FLD QL: NEGATIVE — SIGNIFICANT CHANGE UP
WBC # BLD: 12.39 K/UL — HIGH (ref 3.8–10.5)
WBC # FLD AUTO: 12.39 K/UL — HIGH (ref 3.8–10.5)
WBC UR QL: 1 /HPF — SIGNIFICANT CHANGE UP (ref 0–5)

## 2023-09-13 PROCEDURE — 93975 VASCULAR STUDY: CPT

## 2023-09-13 PROCEDURE — 74177 CT ABD & PELVIS W/CONTRAST: CPT | Mod: 26,MA

## 2023-09-13 PROCEDURE — 87637 SARSCOV2&INF A&B&RSV AMP PRB: CPT

## 2023-09-13 PROCEDURE — 82803 BLOOD GASES ANY COMBINATION: CPT

## 2023-09-13 PROCEDURE — 81001 URINALYSIS AUTO W/SCOPE: CPT

## 2023-09-13 PROCEDURE — 93975 VASCULAR STUDY: CPT | Mod: 26

## 2023-09-13 PROCEDURE — 84132 ASSAY OF SERUM POTASSIUM: CPT

## 2023-09-13 PROCEDURE — 85025 COMPLETE CBC W/AUTO DIFF WBC: CPT

## 2023-09-13 PROCEDURE — 76870 US EXAM SCROTUM: CPT

## 2023-09-13 PROCEDURE — 76870 US EXAM SCROTUM: CPT | Mod: 26

## 2023-09-13 PROCEDURE — 87591 N.GONORRHOEAE DNA AMP PROB: CPT

## 2023-09-13 PROCEDURE — 80053 COMPREHEN METABOLIC PANEL: CPT

## 2023-09-13 PROCEDURE — 99285 EMERGENCY DEPT VISIT HI MDM: CPT

## 2023-09-13 PROCEDURE — 74177 CT ABD & PELVIS W/CONTRAST: CPT | Mod: MA

## 2023-09-13 PROCEDURE — 85018 HEMOGLOBIN: CPT

## 2023-09-13 PROCEDURE — 85014 HEMATOCRIT: CPT

## 2023-09-13 PROCEDURE — 87491 CHLMYD TRACH DNA AMP PROBE: CPT

## 2023-09-13 PROCEDURE — 87086 URINE CULTURE/COLONY COUNT: CPT

## 2023-09-13 PROCEDURE — 82330 ASSAY OF CALCIUM: CPT

## 2023-09-13 PROCEDURE — 83605 ASSAY OF LACTIC ACID: CPT

## 2023-09-13 PROCEDURE — 84295 ASSAY OF SERUM SODIUM: CPT

## 2023-09-13 PROCEDURE — 82435 ASSAY OF BLOOD CHLORIDE: CPT

## 2023-09-13 PROCEDURE — 99285 EMERGENCY DEPT VISIT HI MDM: CPT | Mod: 25

## 2023-09-13 PROCEDURE — 82947 ASSAY GLUCOSE BLOOD QUANT: CPT

## 2023-09-13 RX ORDER — IBUPROFEN 200 MG
400 TABLET ORAL ONCE
Refills: 0 | Status: COMPLETED | OUTPATIENT
Start: 2023-09-13 | End: 2023-09-13

## 2023-09-13 RX ORDER — ACETAMINOPHEN 500 MG
975 TABLET ORAL ONCE
Refills: 0 | Status: COMPLETED | OUTPATIENT
Start: 2023-09-13 | End: 2023-09-13

## 2023-09-13 RX ADMIN — Medication 975 MILLIGRAM(S): at 15:59

## 2023-09-13 RX ADMIN — Medication 400 MILLIGRAM(S): at 15:59

## 2023-09-13 NOTE — ED PROVIDER NOTE - ATTENDING APP SHARED VISIT CONTRIBUTION OF CARE
MD Knight:  patient seen and evaluated with the PA.  I was present for key portions of the History and Physical, and I agree with the Impression and Plan.      Patient is a 27-year-old male complaining of 2 weeks intermittent right inguinal discomfort.  Medical history significant for left inguinal hernia repair by Dr. Driscoll 2 weeks ago.  Endorses that he was having the same discomfort at the time of the left inguinal hernia repair.  Patient subsequently presented to the ED on postop day 3 postoperative pain, was seen and medically cleared by general surgery.    Location of pain is in the right lower quadrant right inguinal area and radiates into the right testicle.  Patient denies dysuria hematuria drainage.  He also states that the left inguinal hernia was diagnosed clinically, and that he went to a different surgeon before Dr. Driscoll who did not feel like he needed an inguinal hernia repair.  Dr. Driscoll felt that her hernia was present and performed the repair on August 30    Vital signs within normal limits  Gen: Well appearing M in NAD.  Head: NC/AT.   PERRL, EOMI.  Neck: trachea midline, supple.  Resp:  No distress, CTA B.  Cardiac RRR, no RMG.    Abdomen:  soft, nondistended, + Right lower quadrant tenderness to palpation, with associated right inguinal tenderness to palpation without bulging or masses upon Valsalva.  Ext: no deformities, no edema.  Neuro:  A&Ox4 appears non focal. Skin:  Warm and dry as visualized, no rash.   Psych:  Normal affect and mood.    Medical decision making  Patient seen and evaluated in quick look where a ultrasound was ordered which is subsequently resulted as normal.  Good flow to both testicles.    Physical exam is concerning for appendicitis given the right lower quadrant pain.  If CT is unremarkable, we would discharge the patient to follow-up with Dr. Driscoll with whom he has an appointment tomorrow.

## 2023-09-13 NOTE — ED ADULT TRIAGE NOTE - IDEAL BODY WEIGHT(KG)
Awaiting IVCU bed assignment, spoke with pt daughter over the phone, updated of pt status and informed room assigned is [id number] and is in the process of being cleaned. 89

## 2023-09-13 NOTE — ED PROVIDER NOTE - RAPID ASSESSMENT
27-year-old male male history of inguinal hernia surgery last week presents to the ED complaining of right lower quadrant pain.  Patient reports right lower quadrant pain began after the surgery last week but has gotten progressively worse today is moderate severity, took ibuprofen at 10 AM with some relief.  Patient was seen in urgent care who referred him to the emergency department for rule out appendicitis.  Patient also reporting throbbing pain in the right testicle since last week.  Patient denies urinary complaints dysuria, hematuria, frequency.  Patient states fever Tmax 102 last night.  Patient denies nausea, vomiting, melena, hematochezia, does note occasional diarrhea. Pt also being treated for otitis externa since yesterday.    Patient was seen as a rapid assessment (QPA) patient. The patient will be seen and further worked up in the main emergency department and their care will be completed by the main emergency department team along with a thorough physical exam. Receiving team will follow up on labs, analgesia, any clinical imaging, reassess and disposition as clinically indicated, all decisions regarding the progression of care will be made at their discretion. 27-year-old male male history of inguinal hernia surgery last week presents to the ED complaining of right lower quadrant pain.  Patient reports right lower quadrant pain began after the surgery last week but has gotten progressively worse today is moderate severity, took ibuprofen at 10 AM with some relief.  Patient was seen in urgent care who referred him to the emergency department for rule out appendicitis.  Patient also reporting throbbing pain in the right testicle since last week.  Patient denies urinary complaints dysuria, hematuria, frequency.  Patient states fever Tmax 102 last night.  Patient denies nausea, vomiting, melena, hematochezia, does note occasional diarrhea. Pt also being treated for otitis externa since yesterday.    Patient was seen as a rapid assessment (QPA) patient. The patient will be seen and further worked up in the main emergency department and their care will be completed by the main emergency department team along with a thorough physical exam. Receiving team will follow up on labs, analgesia, any clinical imaging, reassess and disposition as clinically indicated, all decisions regarding the progression of care will be made at their discretion. I suly saunders, didn't participate in the care of this patient, I was available for question by the qPA but none were asked by the acp regarding this case.

## 2023-09-13 NOTE — ED PROVIDER NOTE - PATIENT PORTAL LINK FT
You can access the FollowMyHealth Patient Portal offered by [facility] by registering at the following website: [email or web address]. By joining [employer]’s FollowMyHealth portal, you will also be able to view your health information using other applications (apps) compatible with our system. You can access the FollowMyHealth Patient Portal offered by [facility] by registering at the following website: [email or web address]. By joining [employer]’s FollowMyHealth portal, you will also be able to view your health information using other applications (apps) compatible with our system.

## 2023-09-13 NOTE — ED ADULT TRIAGE NOTE - CHIEF COMPLAINT QUOTE
right lower abdominal pain for couple of weeks, worse last night with fever, seen in Urgent Care today, sent here to r/o Appendicitis

## 2023-09-13 NOTE — ED PROVIDER NOTE - PROGRESS NOTE DETAILS
MD Martha (PGY-2) Received sign out on patient. In brief, 27-year-old male with past surgical history of recent left inguinal hernia repair 2 weeks ago (Dr. Driscoll), presenting with right lower quadrant pain rating down to the testicles.  Patient went to urgent care today and was found to have an external ear infection.  Patient was given antibiotic ear drops and ear wick by urgent care.  Patient's lab work reviewed.  Patient has a mildly elevated white count of 12.39, rest of lab work unremarkable.  Patient received ultrasound of the testicles which was unremarkable.  Patient is pending a CT abdomen pelvis to assess for right-sided inguinal hernia versus appendicitis.  Dispo pending imaging.  Tentative plan, should imaging be negative, patient will follow-up with his surgeon Dr. Driscoll tomorrow at his scheduled appointment. MD Knight:  Labs remarkable only for a white count of 12.39 which is nominally elevated, and in and of itself does not necessitate further aggressive ED work-up.  Prelim CT read shows no explanation for his right lower quadrant abdominal pain.  Patient can be safely discharged to follow-up with Dr. Driscoll tomorrow.  Strict return precautions. MD Martha (PGY-2) CT with mild splenomegaly again seen with shotty retroperitoneal and mildly   prominent mesenteric lymph nodes. No signs of appendicitis. Will have patient follow up with Dr. Driscoll concerning these finding. Results were discussed with patient as well as return precautions and follow up plan with PCP and/or specialist. Time was taken to answer any questions that the patient had before providing them with discharge paperwork.

## 2023-09-13 NOTE — ED PROVIDER NOTE - CLINICAL SUMMARY MEDICAL DECISION MAKING FREE TEXT BOX
Medical decision making  Patient seen and evaluated in quick look where a ultrasound was ordered which is subsequently resulted as normal.  Good flow to both testicles.    Physical exam is concerning for appendicitis given the right lower quadrant pain.  If CT is unremarkable, we would discharge the patient to follow-up with Dr. Driscoll with whom he has an appointment tomorrow.

## 2023-09-13 NOTE — ED PROVIDER NOTE - NSFOLLOWUPINSTRUCTIONS_ED_ALL_ED_FT
Abdominal Pain    Many things can cause abdominal pain. Many times, abdominal pain is not caused by a disease and will improve without treatment. Your health care provider will do a physical exam to determine if there is a dangerous cause of your pain; blood tests and imaging may help determine the cause of your pain. However, in many cases, no cause may be found and you may need further testing as an outpatient. Monitor your abdominal pain for any changes.     SEEK IMMEDIATE MEDICAL CARE IF YOU HAVE ANY OF THE FOLLOWING SYMPTOMS: worsening abdominal pain, uncontrollable vomiting, profuse diarrhea, inability to have bowel movements or pass gas, black or bloody stools, fever accompanying chest pain or back pain, or fainting. These symptoms may represent a serious problem that is an emergency. Do not wait to see if the symptoms will go away. Get medical help right away. Call 911 and do not drive yourself to the hospital.    Follow-up as previously scheduled with Dr. Driscoll tomorrow.  Avery Driscoll  [address] [place] [address], [address]  [place] [address]  Phone: [phone]  Fax: [phone]  Follow Up Time: 1-3 Days

## 2023-09-13 NOTE — ED PROVIDER NOTE - CARE PROVIDER_API CALL
Avery Driscoll  Surgery  [address] [place] [address], [address]  [place] [address]  Phone: [phone]  Fax: [phone]  Follow Up Time: 1-3 Days

## 2023-09-13 NOTE — ED ADULT NURSE NOTE - OBJECTIVE STATEMENT
28 yo male PMHx left inguinal hernia surgery 2 weeks ago (Dr. Driscoll) presents to the ED complaining of RLQ pain radiating to right testicle.  First noted pain about 2-3 weeks ago even before left inguinal hernia surgery, with mild at that time.  Day of surgery does report had worsening pain on the right side but surgery was to be done on the left.  Last few days patient has noticed worsening RLQ pain, intermittent, at times feels sharp pain testicle when lying down and feels he has to stand up to alleviate it though this does not improve it. Went to urgent care today and was advised to come to ED for evaluation.  Additionally noted left ear pain when she was diagnosed with ear infection at urgent care today and had a placed in the ear with antibiotics.  Denies n/v/d, constipation, chest pain, shortness of breath, dysuria, hematuria, frequency, penile discharge. IV intact from triage, patient swabbed and sent, pt aware we need urine sample. Seen and eval by MD. Rm in lowest position and locked.

## 2023-09-14 LAB
C TRACH RRNA SPEC QL NAA+PROBE: SIGNIFICANT CHANGE UP
CULTURE RESULTS: SIGNIFICANT CHANGE UP
N GONORRHOEA RRNA SPEC QL NAA+PROBE: SIGNIFICANT CHANGE UP
SPECIMEN SOURCE: SIGNIFICANT CHANGE UP
SPECIMEN SOURCE: SIGNIFICANT CHANGE UP

## 2023-09-15 ENCOUNTER — APPOINTMENT (OUTPATIENT)
Dept: SURGERY | Facility: CLINIC | Age: 28
End: 2023-09-15
Payer: COMMERCIAL

## 2023-09-15 VITALS
WEIGHT: 250 LBS | HEIGHT: 77 IN | DIASTOLIC BLOOD PRESSURE: 89 MMHG | RESPIRATION RATE: 17 BRPM | HEART RATE: 92 BPM | SYSTOLIC BLOOD PRESSURE: 140 MMHG | TEMPERATURE: 96.7 F | OXYGEN SATURATION: 99 % | BODY MASS INDEX: 29.52 KG/M2

## 2023-09-15 DIAGNOSIS — Z98.890 OTHER SPECIFIED POSTPROCEDURAL STATES: ICD-10-CM

## 2023-09-15 PROCEDURE — 99024 POSTOP FOLLOW-UP VISIT: CPT

## 2024-01-11 NOTE — ED ADULT TRIAGE NOTE - SOURCE OF INFORMATION
Tirzepatide-Weight Management 2.5 MG/0.5ML SOAJ [[id number]]    Order Details  Dose: 2.5 mg Route: SubCUTAneous Frequency: WEEKLY   Dispense Quantity: 2 mL Refills: 2          Sig: Inject 2.5 mg into the skin once a week         Start Date: 01/10/24 End Date: --   Written Date: 01/10/24 Expiration Date: 01/09/25       Associated Diagnoses: Morbid obesity due to excess calories (HCC) [E66.01]   Providers    Authorizing Provider: Tayla Stanford DO NPI: [id number]   Ordering User: Tayla Stanford DO          Pharmacy    [facility] - [address] -  [phone] - F [phone]  [address] [place] [address]  Phone: [phone]  Fax: [phone]     Order Class:    Order Class   Normal [1]     Destination    This Order   EPA REQUEST [[id number]]   E-PRESCRIBING INTERFACE [[id number]]     Action Summary    Action User: --            Warnings Override History    No Interaction Warnings Shown      Tirzepatide-Weight Management 2.5 MG/0.5ML SOAJ  Date: 1/10/2024 Department: [facility]  Ordering/Authorizing: Tayla Stanford DO     Outpatient Medication Detail     Disp Refills Start End    Tirzepatide-Weight Management 2.5 MG/0.5ML SOAJ 2 mL 2 1/10/2024 --    Sig - Route: Inject 2.5 mg into the skin once a week - SubCUTAneous    Sent to pharmacy as: Tirzepatide-Weight Management 2.5 MG/0.5ML Subcutaneous Solution Auto-injector    E-Prescribing Status: Receipt confirmed by pharmacy (1/10/2024 12:25 PM EST)    Prior authorization: Payer Waiting for Response      
Submitted PA for Zepbound 2.5MG/0.5ML pen-injectors  Via CMM Key: [id number] STATUS: APPROVED  Coverage Start Date:12/12/2023  Coverage End Date:09/07/2024    If this requires a response please respond to the pool ( P MHCX PSC MEDICATION PRE-AUTH).      Thank you please advise patient.      
Patient

## 2024-02-07 NOTE — ED ADULT TRIAGE NOTE - HEIGHT IN INCHES
What Type Of Note Output Would You Prefer (Optional)?: Standard Output What Is The Reason For Today's Visit?: Full Body Skin Examination What Is The Reason For Today's Visit? (Being Monitored For X): concerning skin lesions on an annual basis 5

## 2024-08-01 NOTE — REASON FOR VISIT
[FreeTextEntry2] : Patient is here today for a new visit on the left wrist. Has a history of surgery on the left wrist.
No

## (undated) DEVICE — SUT MONOCRYL 4-0 27" PS-2 UNDYED

## (undated) DEVICE — SOL IRR POUR NS 0.9% 500ML

## (undated) DEVICE — PACK ADULT HERNIA

## (undated) DEVICE — SUT SURGIPRO 2-0 30" GS-22

## (undated) DEVICE — DRAPE TOWEL BLUE 17" X 24"

## (undated) DEVICE — DRSG STERISTRIPS 0.5 X 4"

## (undated) DEVICE — SPECIMEN CONTAINER 100ML

## (undated) DEVICE — SUT POLYSORB 3-0 30" V-20 UNDYED

## (undated) DEVICE — GLV 8.5 PROTEXIS (WHITE)

## (undated) DEVICE — SUT SURGIPRO 0 30" GS-22

## (undated) DEVICE — DRAIN PENROSE .5" X 18" LATEX

## (undated) DEVICE — POSITIONER PATIENT SAFETY STRAP 3X60"

## (undated) DEVICE — DRAPE INSTRUMENT POUCH 6.75" X 11"

## (undated) DEVICE — VENODYNE/SCD SLEEVE CALF MEDIUM

## (undated) DEVICE — MEDICATION LABELS W MARKER

## (undated) DEVICE — LAP PAD 18 X 18"

## (undated) DEVICE — POSITIONER FOAM EGG CRATE ULNAR 2PCS (PINK)

## (undated) DEVICE — DRSG CURITY GAUZE SPONGE 4 X 4" 12-PLY

## (undated) DEVICE — LIGASURE EXACT DISSECTOR

## (undated) DEVICE — SOL IRR POUR H2O 250ML

## (undated) DEVICE — PREP CHLORAPREP HI-LITE ORANGE 26ML

## (undated) DEVICE — MARKING PEN W RULER

## (undated) DEVICE — VISITEC 4X4

## (undated) DEVICE — BLADE SCALPEL SAFETYLOCK #15

## (undated) DEVICE — GOWN TRIMAX LG

## (undated) DEVICE — WARMING BLANKET UPPER ADULT

## (undated) DEVICE — SUT POLYSORB 2-0 18" TIES UNDYED

## (undated) DEVICE — SUT POLYSORB 2-0 30" V-20 UNDYED